# Patient Record
Sex: FEMALE | Race: WHITE | NOT HISPANIC OR LATINO | Employment: FULL TIME | URBAN - METROPOLITAN AREA
[De-identification: names, ages, dates, MRNs, and addresses within clinical notes are randomized per-mention and may not be internally consistent; named-entity substitution may affect disease eponyms.]

---

## 2024-07-03 ENCOUNTER — OCCUPATIONAL HEALTH (OUTPATIENT)
Dept: URGENT CARE | Facility: CLINIC | Age: 24
End: 2024-07-03

## 2024-07-04 LAB
ALBUMIN SERPL-MCNC: 5 G/DL (ref 4–5)
ALP SERPL-CCNC: 96 IU/L (ref 44–121)
ALT SERPL-CCNC: 17 IU/L (ref 0–32)
APPEARANCE UR: ABNORMAL
AST SERPL-CCNC: 21 IU/L (ref 0–40)
BACTERIA #/AREA URNS HPF: ABNORMAL /[HPF]
BASOPHILS # BLD AUTO: 0 X10E3/UL (ref 0–0.2)
BASOPHILS NFR BLD AUTO: 0 %
BILIRUB SERPL-MCNC: 0.4 MG/DL (ref 0–1.2)
BILIRUB UR QL STRIP: NEGATIVE
BUN SERPL-MCNC: 10 MG/DL (ref 6–20)
BUN/CREAT SERPL: 14 (ref 9–23)
CALCIUM SERPL-MCNC: 10.1 MG/DL (ref 8.7–10.2)
CASTS URNS QL MICRO: ABNORMAL /LPF
CHLORIDE SERPL-SCNC: 101 MMOL/L (ref 96–106)
CHOLEST SERPL-MCNC: 187 MG/DL (ref 100–199)
CO2 SERPL-SCNC: 24 MMOL/L (ref 20–29)
COLOR UR: YELLOW
CREAT SERPL-MCNC: 0.72 MG/DL (ref 0.57–1)
EOSINOPHIL # BLD AUTO: 0.2 X10E3/UL (ref 0–0.4)
EOSINOPHIL NFR BLD AUTO: 1 %
EPI CELLS #/AREA URNS HPF: ABNORMAL /HPF (ref 0–10)
ERYTHROCYTE [DISTWIDTH] IN BLOOD BY AUTOMATED COUNT: 12.5 % (ref 11.7–15.4)
EST. GFR  (NO RACE VARIABLE): 121 ML/MIN/1.73
GGT SERPL-CCNC: 22 IU/L (ref 0–60)
GLOBULIN SER CALC-MCNC: 3.1 G/DL (ref 1.5–4.5)
GLUCOSE SERPL-MCNC: 86 MG/DL (ref 70–99)
GLUCOSE UR QL STRIP: NEGATIVE
HCT VFR BLD AUTO: 42.5 % (ref 34–46.6)
HDLC SERPL-MCNC: 63 MG/DL
HGB BLD-MCNC: 14 G/DL (ref 11.1–15.9)
HGB UR QL STRIP: ABNORMAL
HIV 1+2 AB+HIV1 P24 AG SERPL QL IA: NON REACTIVE
IMM GRANULOCYTES # BLD AUTO: 0 X10E3/UL (ref 0–0.1)
IMM GRANULOCYTES NFR BLD AUTO: 0 %
KETONES UR QL STRIP: NEGATIVE
LDLC SERPL CALC-MCNC: 107 MG/DL (ref 0–99)
LEUKOCYTE ESTERASE UR QL STRIP: NEGATIVE
LYMPHOCYTES # BLD AUTO: 1.7 X10E3/UL (ref 0.7–3.1)
LYMPHOCYTES NFR BLD AUTO: 13 %
MCH RBC QN AUTO: 31.1 PG (ref 26.6–33)
MCHC RBC AUTO-ENTMCNC: 32.9 G/DL (ref 31.5–35.7)
MCV RBC AUTO: 94 FL (ref 79–97)
MICRO URNS: ABNORMAL
MONOCYTES # BLD AUTO: 0.7 X10E3/UL (ref 0.1–0.9)
MONOCYTES NFR BLD AUTO: 5 %
NEUTROPHILS # BLD AUTO: 10.6 X10E3/UL (ref 1.4–7)
NEUTROPHILS NFR BLD AUTO: 81 %
NITRITE UR QL STRIP: NEGATIVE
PH UR STRIP: 6.5 [PH] (ref 5–7.5)
PLATELET # BLD AUTO: 397 X10E3/UL (ref 150–450)
POTASSIUM SERPL-SCNC: 4.6 MMOL/L (ref 3.5–5.2)
PROT SERPL-MCNC: 8.1 G/DL (ref 6–8.5)
PROT UR QL STRIP: ABNORMAL
RBC # BLD AUTO: 4.5 X10E6/UL (ref 3.77–5.28)
RBC #/AREA URNS HPF: ABNORMAL /HPF (ref 0–2)
RPR SER QL: NON REACTIVE
SODIUM SERPL-SCNC: 139 MMOL/L (ref 134–144)
SP GR UR STRIP: 1.02 (ref 1–1.03)
TRIGL SERPL-MCNC: 94 MG/DL (ref 0–149)
UROBILINOGEN UR STRIP-MCNC: 0.2 MG/DL (ref 0.2–1)
VLDLC SERPL CALC-MCNC: 17 MG/DL (ref 5–40)
WBC # BLD AUTO: 13.3 X10E3/UL (ref 3.4–10.8)
WBC #/AREA URNS HPF: ABNORMAL /HPF (ref 0–5)

## 2024-10-25 ENCOUNTER — OCCUPATIONAL HEALTH (OUTPATIENT)
Dept: URGENT CARE | Facility: CLINIC | Age: 24
End: 2024-10-25
Payer: COMMERCIAL

## 2024-10-25 DIAGNOSIS — Z02.83 ENCOUNTER FOR DRUG SCREENING: Primary | ICD-10-CM

## 2024-10-25 LAB
CTP QC/QA: YES
POC 10 PANEL DRUG SCREEN: NEGATIVE

## 2025-05-18 ENCOUNTER — OFFICE VISIT (OUTPATIENT)
Dept: URGENT CARE | Facility: CLINIC | Age: 25
End: 2025-05-18
Payer: COMMERCIAL

## 2025-05-18 VITALS
DIASTOLIC BLOOD PRESSURE: 73 MMHG | BODY MASS INDEX: 34.36 KG/M2 | HEIGHT: 60 IN | HEART RATE: 108 BPM | WEIGHT: 175 LBS | RESPIRATION RATE: 18 BRPM | TEMPERATURE: 98 F | SYSTOLIC BLOOD PRESSURE: 122 MMHG | OXYGEN SATURATION: 98 %

## 2025-05-18 DIAGNOSIS — B35.9 RINGWORM: Primary | ICD-10-CM

## 2025-05-18 PROCEDURE — 99204 OFFICE O/P NEW MOD 45 MIN: CPT | Mod: S$GLB,,,

## 2025-05-18 RX ORDER — DROSPIRENONE AND ETHINYL ESTRADIOL 0.02-3(28)
1 KIT ORAL
COMMUNITY
Start: 2024-12-12

## 2025-05-18 RX ORDER — TERBINAFINE HYDROCHLORIDE 250 MG/1
250 TABLET ORAL DAILY
Qty: 30 TABLET | Refills: 0 | Status: SHIPPED | OUTPATIENT
Start: 2025-05-18 | End: 2025-06-17

## 2025-05-18 RX ORDER — PRENATAL VIT 91/IRON/FOLIC/DHA 28-975-200
COMBINATION PACKAGE (EA) ORAL 2 TIMES DAILY
Qty: 15 G | Refills: 0 | Status: SHIPPED | OUTPATIENT
Start: 2025-05-18 | End: 2025-06-01

## 2025-05-18 NOTE — LETTER
May 18, 2025      [facility]  [id number] [facility] [id number]       Patient: Stacey Mac   YOB: 2000  Date of Visit: 05/18/2025    To Whom It May Concern:    Jewell Mac  was at [facility] on 05/18/2025. The patient may return to work/school on 5/19/25 with no restrictions. If you have any questions or concerns, or if I can be of further assistance, please do not hesitate to contact me.    Sincerely,    TELLY Neal

## 2025-05-18 NOTE — PATIENT INSTRUCTIONS
Use topical Lamisil first-line.  If you do not see any improvement in 14 days then begin oral treatment.  If you begin they will treatment you will need to follow up with your primary care doctor for blood test.  These medications can be hard on liver.

## 2025-05-18 NOTE — PROGRESS NOTES
Subjective:      Patient ID: Stacey Mac is a 24 y.o. female.    Vitals:  height is 5' (1.524 m) and weight is 79.4 kg (175 lb). Her oral temperature is 98.3 °F (36.8 °C). Her blood pressure is 122/73 and her pulse is 108. Her respiration is 18 and oxygen saturation is 98%.     Chief Complaint: Rash    In clinic for evaluation of left leg lesion that has been present since September.  She has tried clotrimazole, and clobetasol with no improvement.  She actually reports these ointments have made it worse.  She describes it as itching.    Rash  This is a recurrent problem. The current episode started more than 1 month ago. The problem has been gradually worsening since onset. The affected locations include the left upper leg. The rash is characterized by redness and itchiness. It is unknown if there was an exposure to a precipitant. Past treatments include antibiotic cream. The treatment provided no relief.       Skin:  Positive for rash.      Objective:     Physical Exam   Constitutional: She is oriented to person, place, and time. She is cooperative.   HENT:   Head: Normocephalic and atraumatic.   Ears:   Right Ear: External ear normal.   Left Ear: External ear normal.   Nose: Nose normal.   Mouth/Throat: Uvula is midline, oropharynx is clear and moist and mucous membranes are normal. Mucous membranes are moist. Oropharynx is clear.   Eyes: Conjunctivae and lids are normal. Pupils are equal, round, and reactive to light. Extraocular movement intact   Neck: Trachea normal and phonation normal. Neck supple.   Cardiovascular: Normal rate, regular rhythm, normal heart sounds and normal pulses.   Pulmonary/Chest: Effort normal and breath sounds normal.   Abdominal: Normal appearance.   Neurological: no focal deficit. She is alert, oriented to person, place, and time and at baseline. She has normal motor skills, normal sensation and intact cranial nerves (2-12). Gait and coordination normal. GCS eye subscore is 4. GCS  verbal subscore is 5. GCS motor subscore is 6.   Skin: Skin is warm and dry. Capillary refill takes 2 to 3 seconds. lesion        Psychiatric: She experiences Normal attention and Normal perception. Her speech is normal and behavior is normal. Mood, judgment and thought content normal.   Nursing note and vitals reviewed.    Assessment:     1. Ringworm        Plan:       Ringworm  -     terbinafine HCL (LAMISIL) 250 mg tablet; Take 1 tablet (250 mg total) by mouth once daily.  Dispense: 30 tablet; Refill: 0  -     terbinafine HCL (LAMISIL) 1 % cream; Apply topically 2 (two) times daily. for 14 days  Dispense: 15 g; Refill: 0          Medical Decision Making:   History:   Old Medical Records: I decided to obtain old medical records.  Old Records Summarized: records from clinic visits.  Initial Assessment:   In clinic for evaluation of to pruritic skin lesions on the medial left upper thigh.  Present for several months.  Has tried to topical creams which worsened symptoms.  With the 1st was clotrimazole and the 2nd was clobetasol.  I have supplied her with a topical terbinafine to use first-line.  If no improvement after 14 days then begin oral treatment.  She has been instructed to follow up with her primary care doctor for labs prior to refill on medication if no improvement.  Differential Diagnosis:   Ecchymosis, psoriasis, eczema,